# Patient Record
(demographics unavailable — no encounter records)

---

## 2025-07-29 NOTE — SIGNATURES
[TextEntry] : I, Arelis Pascual, am scribing for and in the presence of HUNTER Becker M.D. in the following sections: HISTORY OF PRESENT ILLNESS, PAST MEDICAL/FAMILY/SOCIAL HISTORY, REVIEW OF SYSTEMS, PHYSICAL EXAM, ASSESSMENT/PLAN.       All medical record entries made by the Scribe were at my,  HUNTER Becker M.D., direction and personally dictated by me on 04/10/2025. I have personally reviewed the chart and agree that the record reflects my personal performance of the history, physical exam, assessment, and plan.

## 2025-07-29 NOTE — PLAN
[FreeTextEntry1] : Health Maintenance:  BSA, calcium, vitamin D and exercise d/w pt Pap/HPV,  GC/Chl STI panel Advised pt to see PCP and dermatologist annually RTO PRN or for annual gyn exam  RX for Tri-Sprintec Condom use encouraged

## 2025-07-29 NOTE — HISTORY OF PRESENT ILLNESS
[FreeTextEntry1] : 07/29/2025. HENNY RAYGOZA 22-year-old female G0 LMP 07/13/25 She presents for an annual gyn exam.  She reports monthly bleed, not too heavy, not painful. She denies intermenstrual bleeding.  She denies abdominal and pelvic pain, no vaginal discharge or vaginitis symptoms. She reports normal urination, no dysuria, no incontinence of urine. BM is normal per patient, no blood in stool or constipation/diarrhea.    Pt is sexually active w partner of x2yrs  MedHx: Hx of anxiety, depression, ADD, acid reflux Meds: Tri-Sprintec (started at 18 years old), Omeprazole ObHx: G0 GynHx: No Hx of STI, FIbroids, Ovarian cysts, abnl paps, or pelvic infections. SurgHx: denies FamHx: Denies FHx of breast, ovarian, uterine, colon, pancreatic, or prostate cancer. All: NKDA Social: vaping (01/23- 01/24)- has quit, denies drugs and tobacco, social alcohol S/P Gardasil  PHQ=0 [PapSmeardate] : 04/24 [TextBox_31] : [place] [No] : Patient does not have concerns regarding sex [Currently Active] : currently active [FreeTextEntry3] : Trisrpintec

## 2025-07-29 NOTE — PHYSICAL EXAM
[Chaperoned Physical Exam] : A chaperone was present in the examining room during all aspects of the physical examination. [Appropriately responsive] : appropriately responsive [Alert] : alert [No Acute Distress] : no acute distress [No Lymphadenopathy] : no lymphadenopathy [Regular Rate Rhythm] : regular rate rhythm [No Murmurs] : no murmurs [Clear to Auscultation B/L] : clear to auscultation bilaterally [Soft] : soft [Non-tender] : non-tender [Non-distended] : non-distended [No HSM] : No HSM [No Lesions] : no lesions [No Mass] : no mass [Oriented x3] : oriented x3 [Examination Of The Breasts] : a normal appearance [No Masses] : no breast masses were palpable [Labia Majora] : normal [Labia Minora] : normal [Normal] : normal [Uterine Adnexae] : normal [FreeTextEntry2] :  Arelis Pascual  [FreeTextEntry1] :  Medical Scribe